# Patient Record
Sex: FEMALE | URBAN - METROPOLITAN AREA
[De-identification: names, ages, dates, MRNs, and addresses within clinical notes are randomized per-mention and may not be internally consistent; named-entity substitution may affect disease eponyms.]

---

## 2018-09-17 ENCOUNTER — APPOINTMENT (RX ONLY)
Dept: URBAN - METROPOLITAN AREA CLINIC 4 | Facility: CLINIC | Age: 33
Setting detail: DERMATOLOGY
End: 2018-09-17

## 2018-09-17 DIAGNOSIS — L81.4 OTHER MELANIN HYPERPIGMENTATION: ICD-10-CM

## 2018-09-17 DIAGNOSIS — L82.1 OTHER SEBORRHEIC KERATOSIS: ICD-10-CM

## 2018-09-17 DIAGNOSIS — D18.0 HEMANGIOMA: ICD-10-CM

## 2018-09-17 DIAGNOSIS — D22 MELANOCYTIC NEVI: ICD-10-CM

## 2018-09-17 PROBLEM — D22.71 MELANOCYTIC NEVI OF RIGHT LOWER LIMB, INCLUDING HIP: Status: ACTIVE | Noted: 2018-09-17

## 2018-09-17 PROBLEM — D18.01 HEMANGIOMA OF SKIN AND SUBCUTANEOUS TISSUE: Status: ACTIVE | Noted: 2018-09-17

## 2018-09-17 PROBLEM — D22.61 MELANOCYTIC NEVI OF RIGHT UPPER LIMB, INCLUDING SHOULDER: Status: ACTIVE | Noted: 2018-09-17

## 2018-09-17 PROBLEM — D22.72 MELANOCYTIC NEVI OF LEFT LOWER LIMB, INCLUDING HIP: Status: ACTIVE | Noted: 2018-09-17

## 2018-09-17 PROBLEM — D22.62 MELANOCYTIC NEVI OF LEFT UPPER LIMB, INCLUDING SHOULDER: Status: ACTIVE | Noted: 2018-09-17

## 2018-09-17 PROBLEM — D22.5 MELANOCYTIC NEVI OF TRUNK: Status: ACTIVE | Noted: 2018-09-17

## 2018-09-17 PROCEDURE — ? SUNSCREEN RECOMMENDATIONS

## 2018-09-17 PROCEDURE — 99203 OFFICE O/P NEW LOW 30 MIN: CPT

## 2018-09-17 PROCEDURE — ? COUNSELING

## 2018-09-17 ASSESSMENT — LOCATION DETAILED DESCRIPTION DERM
LOCATION DETAILED: RIGHT ANTERIOR PROXIMAL THIGH
LOCATION DETAILED: LEFT ULNAR DORSAL HAND
LOCATION DETAILED: LEFT PROXIMAL POSTERIOR UPPER ARM
LOCATION DETAILED: LEFT CENTRAL MALAR CHEEK
LOCATION DETAILED: RIGHT CENTRAL MALAR CHEEK
LOCATION DETAILED: RIGHT PROXIMAL DORSAL FOREARM
LOCATION DETAILED: RIGHT RADIAL DORSAL HAND
LOCATION DETAILED: LEFT INFERIOR ANTERIOR NECK
LOCATION DETAILED: RIGHT RIB CAGE
LOCATION DETAILED: SUPERIOR THORACIC SPINE
LOCATION DETAILED: LEFT ANTERIOR PROXIMAL THIGH
LOCATION DETAILED: LEFT PROXIMAL DORSAL FOREARM
LOCATION DETAILED: LEFT SUPERIOR MEDIAL UPPER BACK
LOCATION DETAILED: PERIUMBILICAL SKIN
LOCATION DETAILED: RIGHT DISTAL POSTERIOR UPPER ARM
LOCATION DETAILED: RIGHT SUPERIOR MEDIAL MIDBACK

## 2018-09-17 ASSESSMENT — LOCATION ZONE DERM
LOCATION ZONE: LEG
LOCATION ZONE: FACE
LOCATION ZONE: HAND
LOCATION ZONE: NECK
LOCATION ZONE: TRUNK
LOCATION ZONE: ARM

## 2018-09-17 ASSESSMENT — LOCATION SIMPLE DESCRIPTION DERM
LOCATION SIMPLE: RIGHT HAND
LOCATION SIMPLE: UPPER BACK
LOCATION SIMPLE: LEFT FOREARM
LOCATION SIMPLE: RIGHT THIGH
LOCATION SIMPLE: LEFT CHEEK
LOCATION SIMPLE: LEFT POSTERIOR UPPER ARM
LOCATION SIMPLE: LEFT THIGH
LOCATION SIMPLE: LEFT UPPER BACK
LOCATION SIMPLE: RIGHT CHEEK
LOCATION SIMPLE: RIGHT POSTERIOR UPPER ARM
LOCATION SIMPLE: RIGHT LOWER BACK
LOCATION SIMPLE: RIGHT FOREARM
LOCATION SIMPLE: LEFT HAND
LOCATION SIMPLE: LEFT ANTERIOR NECK
LOCATION SIMPLE: ABDOMEN

## 2018-09-17 NOTE — HPI: FULL BODY SKIN EXAMINATION
How Severe Are Your Spot(S)?: moderate
What Is The Reason For Today's Visit?: Full Body Skin Examination
What Is The Reason For Today's Visit? (Being Monitored For X): concerning skin lesions on an annual basis
Additional History: New patient. Lesion on left upper thigh for years. FBE.

## 2019-10-29 ENCOUNTER — HOSPITAL ENCOUNTER (INPATIENT)
Facility: MEDICAL CENTER | Age: 34
LOS: 1 days | End: 2019-10-30
Admitting: FAMILY MEDICINE
Payer: COMMERCIAL

## 2019-10-29 LAB
BASOPHILS # BLD AUTO: 0.5 % (ref 0–1.8)
BASOPHILS # BLD: 0.09 K/UL (ref 0–0.12)
EOSINOPHIL # BLD AUTO: 0.08 K/UL (ref 0–0.51)
EOSINOPHIL NFR BLD: 0.5 % (ref 0–6.9)
ERYTHROCYTE [DISTWIDTH] IN BLOOD BY AUTOMATED COUNT: 50.2 FL (ref 35.9–50)
HCT VFR BLD AUTO: 38.9 % (ref 37–47)
HGB BLD-MCNC: 12.5 G/DL (ref 12–16)
HOLDING TUBE BB 8507: NORMAL
IMM GRANULOCYTES # BLD AUTO: 0.24 K/UL (ref 0–0.11)
IMM GRANULOCYTES NFR BLD AUTO: 1.5 % (ref 0–0.9)
LYMPHOCYTES # BLD AUTO: 1.62 K/UL (ref 1–4.8)
LYMPHOCYTES NFR BLD: 9.9 % (ref 22–41)
MCH RBC QN AUTO: 31 PG (ref 27–33)
MCHC RBC AUTO-ENTMCNC: 32.1 G/DL (ref 33.6–35)
MCV RBC AUTO: 96.5 FL (ref 81.4–97.8)
MONOCYTES # BLD AUTO: 1.23 K/UL (ref 0–0.85)
MONOCYTES NFR BLD AUTO: 7.5 % (ref 0–13.4)
NEUTROPHILS # BLD AUTO: 13.14 K/UL (ref 2–7.15)
NEUTROPHILS NFR BLD: 80.1 % (ref 44–72)
NRBC # BLD AUTO: 0 K/UL
NRBC BLD-RTO: 0 /100 WBC
PLATELET # BLD AUTO: 212 K/UL (ref 164–446)
PMV BLD AUTO: 10.3 FL (ref 9–12.9)
RBC # BLD AUTO: 4.03 M/UL (ref 4.2–5.4)
WBC # BLD AUTO: 16.4 K/UL (ref 4.8–10.8)

## 2019-10-29 PROCEDURE — 36415 COLL VENOUS BLD VENIPUNCTURE: CPT

## 2019-10-29 PROCEDURE — 700102 HCHG RX REV CODE 250 W/ 637 OVERRIDE(OP): Performed by: NURSE PRACTITIONER

## 2019-10-29 PROCEDURE — A9270 NON-COVERED ITEM OR SERVICE: HCPCS | Performed by: NURSE PRACTITIONER

## 2019-10-29 PROCEDURE — 85025 COMPLETE CBC W/AUTO DIFF WBC: CPT

## 2019-10-29 PROCEDURE — 700111 HCHG RX REV CODE 636 W/ 250 OVERRIDE (IP): Performed by: NURSE PRACTITIONER

## 2019-10-29 PROCEDURE — 304965 HCHG RECOVERY SERVICES

## 2019-10-29 PROCEDURE — 59409 OBSTETRICAL CARE: CPT

## 2019-10-29 PROCEDURE — 770002 HCHG ROOM/CARE - OB PRIVATE (112)

## 2019-10-29 RX ORDER — IBUPROFEN 600 MG/1
600 TABLET ORAL EVERY 6 HOURS PRN
Status: DISCONTINUED | OUTPATIENT
Start: 2019-10-29 | End: 2019-10-30 | Stop reason: HOSPADM

## 2019-10-29 RX ORDER — METHYLERGONOVINE MALEATE 0.2 MG/ML
0.2 INJECTION INTRAVENOUS
Status: DISCONTINUED | OUTPATIENT
Start: 2019-10-29 | End: 2019-10-29 | Stop reason: HOSPADM

## 2019-10-29 RX ORDER — MISOPROSTOL 200 UG/1
800 TABLET ORAL
Status: DISCONTINUED | OUTPATIENT
Start: 2019-10-29 | End: 2019-10-29 | Stop reason: HOSPADM

## 2019-10-29 RX ORDER — HYDROCODONE BITARTRATE AND ACETAMINOPHEN 5; 325 MG/1; MG/1
1 TABLET ORAL EVERY 4 HOURS PRN
Status: DISCONTINUED | OUTPATIENT
Start: 2019-10-29 | End: 2019-10-30 | Stop reason: HOSPADM

## 2019-10-29 RX ORDER — SODIUM CHLORIDE, SODIUM LACTATE, POTASSIUM CHLORIDE, CALCIUM CHLORIDE 600; 310; 30; 20 MG/100ML; MG/100ML; MG/100ML; MG/100ML
INJECTION, SOLUTION INTRAVENOUS CONTINUOUS
Status: ACTIVE | OUTPATIENT
Start: 2019-10-29 | End: 2019-10-30

## 2019-10-29 RX ORDER — DOCUSATE SODIUM 100 MG/1
100 CAPSULE, LIQUID FILLED ORAL 2 TIMES DAILY PRN
Status: DISCONTINUED | OUTPATIENT
Start: 2019-10-29 | End: 2019-10-30 | Stop reason: HOSPADM

## 2019-10-29 RX ORDER — DEXTROSE, SODIUM CHLORIDE, SODIUM LACTATE, POTASSIUM CHLORIDE, AND CALCIUM CHLORIDE 5; .6; .31; .03; .02 G/100ML; G/100ML; G/100ML; G/100ML; G/100ML
INJECTION, SOLUTION INTRAVENOUS CONTINUOUS
Status: DISCONTINUED | OUTPATIENT
Start: 2019-10-30 | End: 2019-10-30 | Stop reason: HOSPADM

## 2019-10-29 RX ORDER — HYDROCODONE BITARTRATE AND ACETAMINOPHEN 5; 325 MG/1; MG/1
2 TABLET ORAL EVERY 4 HOURS PRN
Status: DISCONTINUED | OUTPATIENT
Start: 2019-10-29 | End: 2019-10-30 | Stop reason: HOSPADM

## 2019-10-29 RX ORDER — VITAMIN A ACETATE, BETA CAROTENE, ASCORBIC ACID, CHOLECALCIFEROL, .ALPHA.-TOCOPHEROL ACETATE, DL-, THIAMINE MONONITRATE, RIBOFLAVIN, NIACINAMIDE, PYRIDOXINE HYDROCHLORIDE, FOLIC ACID, CYANOCOBALAMIN, CALCIUM CARBONATE, FERROUS FUMARATE, ZINC OXIDE, CUPRIC OXIDE 3080; 12; 120; 400; 1; 1.84; 3; 20; 22; 920; 25; 200; 27; 10; 2 [IU]/1; UG/1; MG/1; [IU]/1; MG/1; MG/1; MG/1; MG/1; MG/1; [IU]/1; MG/1; MG/1; MG/1; MG/1; MG/1
1 TABLET, FILM COATED ORAL EVERY MORNING
Status: DISCONTINUED | OUTPATIENT
Start: 2019-10-30 | End: 2019-10-30 | Stop reason: HOSPADM

## 2019-10-29 RX ORDER — SODIUM CHLORIDE, SODIUM LACTATE, POTASSIUM CHLORIDE, CALCIUM CHLORIDE 600; 310; 30; 20 MG/100ML; MG/100ML; MG/100ML; MG/100ML
INJECTION, SOLUTION INTRAVENOUS PRN
Status: DISCONTINUED | OUTPATIENT
Start: 2019-10-29 | End: 2019-10-30 | Stop reason: HOSPADM

## 2019-10-29 RX ADMIN — IBUPROFEN 600 MG: 600 TABLET ORAL at 21:41

## 2019-10-29 RX ADMIN — OXYTOCIN 2000 ML/HR: 10 INJECTION, SOLUTION INTRAMUSCULAR; INTRAVENOUS at 17:43

## 2019-10-29 RX ADMIN — OXYTOCIN 125 ML/HR: 10 INJECTION, SOLUTION INTRAMUSCULAR; INTRAVENOUS at 18:31

## 2019-10-29 ASSESSMENT — LIFESTYLE VARIABLES
TOTAL SCORE: 0
HAVE YOU EVER FELT YOU SHOULD CUT DOWN ON YOUR DRINKING: NO
EVER_SMOKED: NEVER
EVER FELT BAD OR GUILTY ABOUT YOUR DRINKING: NO
TOTAL SCORE: 0
EVER HAD A DRINK FIRST THING IN THE MORNING TO STEADY YOUR NERVES TO GET RID OF A HANGOVER: NO
ALCOHOL_USE: NO
HAVE PEOPLE ANNOYED YOU BY CRITICIZING YOUR DRINKING: NO
TOTAL SCORE: 0
CONSUMPTION TOTAL: INCOMPLETE

## 2019-10-29 ASSESSMENT — COPD QUESTIONNAIRES
HAVE YOU SMOKED AT LEAST 100 CIGARETTES IN YOUR ENTIRE LIFE: NO/DON'T KNOW
DO YOU EVER COUGH UP ANY MUCUS OR PHLEGM?: NO/ONLY WITH OCCASIONAL COLDS OR INFECTIONS
DURING THE PAST 4 WEEKS HOW MUCH DID YOU FEEL SHORT OF BREATH: NONE/LITTLE OF THE TIME
COPD SCREENING SCORE: 0
IN THE PAST 12 MONTHS DO YOU DO LESS THAN YOU USED TO BECAUSE OF YOUR BREATHING PROBLEMS: DISAGREE/UNSURE

## 2019-10-29 ASSESSMENT — PATIENT HEALTH QUESTIONNAIRE - PHQ9
SUM OF ALL RESPONSES TO PHQ9 QUESTIONS 1 AND 2: 0
1. LITTLE INTEREST OR PLEASURE IN DOING THINGS: NOT AT ALL
1. LITTLE INTEREST OR PLEASURE IN DOING THINGS: NOT AT ALL
2. FEELING DOWN, DEPRESSED, IRRITABLE, OR HOPELESS: NOT AT ALL
SUM OF ALL RESPONSES TO PHQ9 QUESTIONS 1 AND 2: 0
2. FEELING DOWN, DEPRESSED, IRRITABLE, OR HOPELESS: NOT AT ALL

## 2019-10-30 VITALS
HEART RATE: 72 BPM | SYSTOLIC BLOOD PRESSURE: 120 MMHG | BODY MASS INDEX: 26.68 KG/M2 | RESPIRATION RATE: 16 BRPM | DIASTOLIC BLOOD PRESSURE: 71 MMHG | HEIGHT: 67 IN | TEMPERATURE: 97.8 F | OXYGEN SATURATION: 96 % | WEIGHT: 170 LBS

## 2019-10-30 LAB
ERYTHROCYTE [DISTWIDTH] IN BLOOD BY AUTOMATED COUNT: 49.1 FL (ref 35.9–50)
HCT VFR BLD AUTO: 34.8 % (ref 37–47)
HGB BLD-MCNC: 11 G/DL (ref 12–16)
MCH RBC QN AUTO: 30.1 PG (ref 27–33)
MCHC RBC AUTO-ENTMCNC: 31.6 G/DL (ref 33.6–35)
MCV RBC AUTO: 95.3 FL (ref 81.4–97.8)
PLATELET # BLD AUTO: 203 K/UL (ref 164–446)
PMV BLD AUTO: 10.4 FL (ref 9–12.9)
RBC # BLD AUTO: 3.65 M/UL (ref 4.2–5.4)
WBC # BLD AUTO: 15.8 K/UL (ref 4.8–10.8)

## 2019-10-30 PROCEDURE — A9270 NON-COVERED ITEM OR SERVICE: HCPCS | Performed by: NURSE PRACTITIONER

## 2019-10-30 PROCEDURE — 85027 COMPLETE CBC AUTOMATED: CPT

## 2019-10-30 PROCEDURE — 36415 COLL VENOUS BLD VENIPUNCTURE: CPT

## 2019-10-30 PROCEDURE — 700102 HCHG RX REV CODE 250 W/ 637 OVERRIDE(OP): Performed by: NURSE PRACTITIONER

## 2019-10-30 RX ADMIN — IBUPROFEN 600 MG: 600 TABLET ORAL at 03:56

## 2019-10-30 RX ADMIN — IBUPROFEN 600 MG: 600 TABLET ORAL at 13:18

## 2019-10-30 ASSESSMENT — EDINBURGH POSTNATAL DEPRESSION SCALE (EPDS)
I HAVE BEEN SO UNHAPPY THAT I HAVE HAD DIFFICULTY SLEEPING: NOT AT ALL
I HAVE BEEN ABLE TO LAUGH AND SEE THE FUNNY SIDE OF THINGS: AS MUCH AS I ALWAYS COULD
I HAVE BEEN ANXIOUS OR WORRIED FOR NO GOOD REASON: NO, NOT AT ALL
I HAVE FELT SAD OR MISERABLE: NO, NOT AT ALL
THINGS HAVE BEEN GETTING ON TOP OF ME: NO, I HAVE BEEN COPING AS WELL AS EVER
I HAVE BEEN SO UNHAPPY THAT I HAVE BEEN CRYING: NO, NEVER
I HAVE FELT SCARED OR PANICKY FOR NO GOOD REASON: NO, NOT AT ALL
THE THOUGHT OF HARMING MYSELF HAS OCCURRED TO ME: NEVER
I HAVE BLAMED MYSELF UNNECESSARILY WHEN THINGS WENT WRONG: NO, NEVER
I HAVE LOOKED FORWARD WITH ENJOYMENT TO THINGS: AS MUCH AS I EVER DID

## 2019-10-30 NOTE — PROGRESS NOTES
1915- Received bedside report from HAMILTON Lara RN. POC discussed. Pt in bed breast feeding. No needs at this time.  1942- Pt up to BR. Voided. Per care and new gown given.   1952- Pt transferred to  via wheelchair with viable baby boy in arms. FOB David at side.   2000- Bedside report given to Sadia HANNON RN. POC discussed. Assumed care. All questions answered.

## 2019-10-30 NOTE — PROGRESS NOTES
Assessment done. Pt. Has no c/o pain.Fundus firm.Lochia light. Fob at bedside,supportive.Pt. Caring for baby with good skill. Breastfeeding going well.Pt. Has received information on education .plan of care for today discussed.

## 2019-10-30 NOTE — LACTATION NOTE
In room for latch assistance.   Infant was found at MOB's right breast in the cross cradle position.  MOB stated feeling discomfort with latch.  Reminded MOB to place infant completely tummy to tummy with her and to wait for infant to open his mouth before placing her nipple into his mouth.  Infant latched onto breast immediately, but MOB reported still had discomfort with latch.  Removed infant from the breast and repeated the above steps, but this time used bottom of MOB's areola to keep infant's bottom lip from curling under.  With wide mouth again, infant latched deep onto MOB's breast and MOB denied feeling pain with latch.  See Lactation Assessment Flow Sheet under infant's chart for latch score and assessment.    Breastfeeding plan remains unchanged.    MOB verbalized understanding of all information provided to her and denied having any further questions at this time.  Encouraged MOB to call for lactation assistance as needed.

## 2019-10-30 NOTE — DISCHARGE INSTRUCTIONS
POSTPARTUM DISCHARGE INSTRUCTIONS FOR MOM    YOB: 1985   Age: 34 y.o.               Admit Date: 10/29/2019     Discharge Date: 10/30/2019  Attending Doctor:  Noman Hair                  Allergies:  Nkda [no known drug allergy]    Discharged to home by car. Discharged via wheelchair, hospital escort: Yes.  Special equipment needed: Not Applicable  Belongings with: Personal  Be sure to schedule a follow-up appointment with your primary care doctor or any specialists as instructed.     Discharge Plan:   Diet Plan: Discussed  Activity Level: Discussed  Confirmed Follow up Appointment: Patient to Call and Schedule Appointment  Confirmed Symptoms Management: Discussed  Influenza Vaccine Indication: Patient Refuses    REASONS TO CALL YOUR OBSTETRICIAN:  1.   Persistent fever or shaking chills (Temperature higher than 100.4)  2.   Heavy bleeding (soaking more than 1 pad per hour); Passing clots  3.   Foul odor from vagina  4.   Mastitis (Breast infection; breast pain, chills, fever, redness)  5.   Urinary pain, burning or frequency  6.   Episiotomy infection  7.   Abdominal incision infection  8.   Severe depression longer than 24 hours    HAND WASHING  · Prior to handling the baby.  · Before breastfeeding or bottle feeding baby.  · After using the bathroom or changing the baby's diaper.    WOUND CARE  Ask your physician for additional care instructions.  In general:    ·  Incision:      · Keep clean and dry.    · Do NOT lift anything heavier than your baby for up to 6 weeks.    · There should not be any opening or pus.      VAGINAL CARE  · Nothing inside vagina for 6 weeks: no sexual intercourse, tampons or douching.  · Bleeding may continue for 2-4 weeks.  Amount may vary.    · Call your physician for heavy bleeding which means soaking more than 1 pad per hour    BIRTH CONTROL  · It is possible to become pregnant at any time after delivery and while breastfeeding.  · Plan to discuss a method of birth  "control with your physician at your follow up visit. visit.    DIET AND ELIMINATION  · Eating more fiber (bran cereal, fruits, and vegetables) and drinking plenty of fluids will help to avoid constipation.  · Urinary frequency after childbirth is normal.    POSTPARTUM BLUES  During the first few days after birth, you may experience a sense of the \"blues\" which may include impatience, irritability or even crying.  These feeling come and go quickly.  However, as many as 1 in 10 women experience emotional symptoms known as postpartum depression.    Postpartum depression:  May start as early as the second or third day after delivery or take several weeks or months to develop.  Symptoms of \"blues\" are present, but are more intense:  Crying spells; loss of appetite; feelings of hopelessness or loss of control; fear of touching the baby; over concern or no concern at all about the baby; little or no concern about your own appearance/caring for yourself; and/or inability to sleep or excessive sleeping.  Contact your physician if you are experiencing any of these symptoms.    Crisis Hotline:  · [place] Crisis Hotline:  2-963-MXNGLVE  Or 1-924.981.9680  · [place] Crisis Hotline:  1-636.807.2644  Or 857-790-9476    PREVENTING SHAKEN BABY:  If you are angry or stressed, PUT THE BABY IN THE CRIB, step away, take some deep breaths, and wait until you are calm to care for the baby.  DO NOT SHAKE THE BABY.  You are not alone, call a supporter for help.    · Crisis Call Center 24/7 crisis line 529-436-4008 or 1-894.499.6647  · You can also text them, text \"ANSWER\" to 185730    QUIT SMOKING/TOBACCO USE:  I understand the use of any tobacco products increases my chance of suffering from future heart disease and could cause other illnesses which may shorten my life. Quitting the use of tobacco products is the single most important thing I can do to improve my health. For further information on smoking / tobacco cessation call a Toll " Free Quit Line at 1-685.521.1463 (*[employer] [place]) or 1-119.749.7967 (American Lung Association) or you can access the web based program at www.lungusa.org.    · [place] Tobacco Users Help Line:  (479) 834-8255       Toll Free: 1-243.783.6659  · Quit Tobacco Program [facility] (631)714-8710    DEPRESSION / SUICIDE RISK:  As you are discharged from this [facility], it is important to learn how to keep safe from harming yourself.    Recognize the warning signs:  · Abrupt changes in personality, positive or negative- including increase in energy   · Giving away possessions  · Change in eating patterns- significant weight changes-  positive or negative  · Change in sleeping patterns- unable to sleep or sleeping all the time   · Unwillingness or inability to communicate  · Depression  · Unusual sadness, discouragement and loneliness  · Talk of wanting to die  · Neglect of personal appearance   · Rebelliousness- reckless behavior  · Withdrawal from people/activities they love  · Confusion- inability to concentrate     If you or a loved one observes any of these behaviors or has concerns about self-harm, here's what you can do:  · Talk about it- your feelings and reasons for harming yourself  · Remove any means that you might use to hurt yourself (examples: pills, rope, extension cords, firearm)  · Get professional help from the community (Mental Health, Substance Abuse, psychological counseling)  · Do not be alone:Call your Safe Contact- someone whom you trust who will be there for you.  · Call your local CRISIS HOTLINE 209-0744 or 045-599-2701  · Call your local Children's Mobile Crisis Response Team [place] (921) 236-9699 or www.[employer]  · Call the toll free National Suicide Prevention Hotlines   · National Suicide Prevention Lifeline 340-121-WBAF (0550)  · National Hope Line Network 800-SUICIDE (561-4326)    DISCHARGE SURVEY:  Thank you for choosing [facility] [place] [facility].  We hope we provided you with very good care.  You may be receiving a survey in the mail.  Please fill it out.  Your opinion is valuable to us.    ADDITIONAL EDUCATIONAL MATERIALS GIVEN TO PATIENT:        My signature on this form indicates that:  1.  I have reviewed and understand the above information  2.  My questions regarding this information have been answered to my satisfaction.  3.  I have formulated a plan with my discharge nurse to obtain my prescribed medication for home.

## 2019-10-30 NOTE — PROGRESS NOTES
1702 pt arrived to L&D in active labor. Pt taken to S215.   1708 DEEPALI Chow at bedside, SVE as noted, 9/100/0, BBOW.  1710 RN at bedside, EFM and TOCO applied, admission procedures initiated at this time. RN and CNM continually at bedside.  1731 SROM, clear fluid, pt complete and pushing.  1732 viable baby Boy Loki born via DEEPALI Chow CNM. APGARs 8/9.  1743 placenta delivered spontaneously, intact. Mother and infant bonding skin-to-skin, both in stable condition.  1900 bedside report given to NOC RN, assumed care, POC discussed, all questions answered. VSS.

## 2019-10-30 NOTE — L&D DELIVERY NOTE
Delivery note     Ayala is a 34 year old  who was admitted on 39 at wks 1 days in active labor. She denied VB/ LOF and confirmed FM.  When she presented on Labor and Delivery her vaginal exam was 9 cm and she was admitted. Over the course of her stay she received a saline lock IV and was unmedicated.  She progressed to 10 cm after SROM at 1731.  She pushed involuntarily and had a NSVB of a viable male infant at 1732 on 10/29/19. Baby's Apgars were 8/9 , weight and length are pending. The baby was delivered to the maternal abdomen. The placenta was delivered approximately 11 minutes after delivery with no complications. A three vessel cord was noted. The perineum was inspected and found to be intact      Total EBL was 250 cc  Mother and baby are recovering in stable condition.   The [facility] was on his way and patient delivered before arrival     Ayesha Chow DNP, CNM

## 2019-10-30 NOTE — PROGRESS NOTES
Patient reviewed discharge instructions. Patient states she does not wish to have injoy reuben. Patient has no c/o pain.

## 2019-10-30 NOTE — CARE PLAN
Problem: Altered physiologic condition related to immediate post-delivery state and potential for bleeding/hemorrhage  Goal: Patient physiologically stable as evidenced by normal lochia, palpable uterine involution and vital signs within normal limits  Outcome: PROGRESSING AS EXPECTED  Note:   Fundus firm, lochia light,blood pressure and other vitals stable. Patient able to ambulate without dizziness. Patient intake of fluids wnl. Post delivery hematocrit and hemoglobin  is stable.      Problem: Potential knowledge deficit related to lack of understanding of self and  care  Goal: Patient will demonstrate ability to care for self and infant  Outcome: PROGRESSING AS EXPECTED  Note:   Patient demonstrates skills in baby care and self care. Patient able to feed,change diapers and comfort baby. Patient demonstrates good hygiene for self and baby.

## 2019-10-30 NOTE — CONSULTS
"Met with MOB for an initial lactation visit.  MOB delivered her fourth baby yesterday, 10/29/19, at 1732 at 39.1 weeks gestation.  Infant is approximately 19.5 hours old.  MOB reported she successfully breast fed her first two children for 1.5 years each and her third child for 2 years. MOB stated she \"always bruises and bleeds\" at the nipples with the start of breastfeeding.  Breast assessment performed and the right breast is bruised and blistered and the left nipple is benign.      Provided latch assistance at the left breast in the cross cradle position.  Re-educated MOB on how to wedge breast for deeper latch and demonstrated proper placement of her hands on infant's body and her breast for maximum support and comfort.  MOB was informed of the importance of keeping infant's head aligned with her breast for optimal milk transfer and to protect her nipples.  Infant latched onto the breast immediately and MOB reported feeling pain initially at the breast, but stated it went away after infant began suckling at the breast.    Encouraged MOB to apply Lanolin cream, which was already provided to her by Primary RN, as instructed to sore and damaged nipples to promote healing.    Informed MOB of the outpatient lactation assistance available to her through [facility] and the [facility] [place].    Breastfeeding Plan:  Offer infant the breast on demand per feeding cues and within 3-4 hours from the last feed for a total of 8 or more times in a 24 hour period.    MOB verbalized understanding of all information provided to her and denied having any further questions at this time.  Encouraged MOB to call for lactation assistance as needed.  "

## 2019-10-30 NOTE — PROGRESS NOTES
1953 -Patient transferred from labor and delivery. Two RN verification of infant and parent armbands. Report received from AISHWARYA Yanes. Patient oriented to unit, call light, emergency light, and infant security. Assessment completed, fundus firm one finger width below u, lochia light. Patient has voided and is doing so without problems. Patient declines intervention for pain at this time. Pitocin infusing at 125 ml/hr. Patient encouraged to call with needs. Rounding in place. Bed is locked and in lowest position, call light within reach.    0213- Pitocin infusion complete.  Pt requesting to have IV taken out.  Educated pt on possible need of IV later if blood loss is great.  Pt verbalized understanding, would still like IV to be removed, state we can obtain IV access later if need be. IV removed.

## 2019-10-30 NOTE — PROGRESS NOTES
FAMILY MEDICINE  POSTPARTUM PROGRESS NOTE    Name:               Olesya Chua   Date/Time:              10/30/2019 2:28 PM  Gestational Age at delivery:  41.0  Admit Date:               10/29/2019  Admitting Dx:               Pregnancy  Labor and delivery, indication for care  Labor and delivery, indication for care  Labor and delivery, indication for care    PP day 1     Subjective: No complaints   Abdominal pain no   Ambulating   no  Tolerating PO  yes  Flatus    yes  Bleeding   Yes light   Voiding   yes   Dizziness   no  Breast feeding  yes  Breast tenderness  no    Vitals:    10/30/19 0600   BP: 120/71   Pulse: 72   Resp: 16   Temp: 36.6 °C (97.8 °F)   SpO2: 96%       Exam:   Gen: A0x4 no distress   HEENT: NCAT  CVS: NS1S2 NMRG  Pulm: CTABL   Abdomen: Fundus below umbilicus, no fundal tenderness   Extremities: No edema    Meds:   No current facility-administered medications on file prior to encounter.      Current Outpatient Medications on File Prior to Encounter   Medication Sig Dispense Refill   • ibuprofen (MOTRIN) 600 MG TABS Take 1 Tab by mouth every 6 hours as needed (Cramping). 30 Each prn   • prenatal vit/fe fumarate/fa (PRENATAL S) 27-0.8 MG TABS Take 1 Tab by mouth every morning.           Lab Results:   Recent Labs     10/29/19  1824 10/30/19  0452   WBC 16.4* 15.8*   RBC 4.03* 3.65*   HEMOGLOBIN 12.5 11.0*   HEMATOCRIT 38.9 34.8*   MCV 96.5 95.3   MCH 31.0 30.1   RDW 50.2* 49.1   PLATELETCT 212 203   MPV 10.3 10.4   NEUTSPOLYS 80.10*  --    LYMPHOCYTES 9.90*  --    MONOCYTES 7.50  --    EOSINOPHILS 0.50  --    BASOPHILS 0.50  --        Assessment and Plan: 34 y.o. yo G6 now P4 s/p .   Uncomplicated postpartum course, lochia light, ambulating and tolerating PO. No signs of infection, or excessive bleeding.   Continue routine postpartum care, encourage ambulation, pain control, anticipate D/C home PPD#1    Hunter Huang M.D.   PGY-3  [facility]   [phone]

## 2019-10-30 NOTE — H&P
"History and Physical      Olesya Chua is a 34 y.o. female  at 39w1d who presents in active labor at 9 cm.    Subjective:   positive  For CTXS.   positive Feels pain   negative for LOF  negative for vaginal bleeding.   positive for fetal movement    ROS: Review of systems not obtained due to patient factors.    History reviewed. No pertinent past medical history.  History reviewed. No pertinent surgical history.  History reviewed. No pertinent family history.  OB history:  NSVB X 3, SAB X2, no complications  Social:   to David, no alcohol, tobacco, or drug use  Stay at home mom, RN    Allergies: Nkda [no known drug allergy]      Prenatal care with [facility] starting at 7 wk GA with no problems:  Patient Active Problem List    Diagnosis Date Noted   • Labor and delivery indication for care or intervention 10/14/2014   • Labor, prolonged latent phase 2012               Objective:      /58   Pulse 67   Ht 1.702 m (5' 7\")   Wt 77.1 kg (170 lb)     General:   uncomfortable and urge to push   Skin:   normal   HEENT:     Lungs:   CTA bilateral   Heart:   S1, S2 normal, no murmur, click, rub or gallop, regular rate and rhythm, brisk carotid upstroke without bruits, peripheral pulses very brisk, chest is clear without rales or wheezing, no pedal edema, no JVD, no hepatosplenomegaly   Abdomen:   gravid, NT   EFW:  #8   Pelvis:  adequate with gynecoid pelvis   FHT:  140BPM   Uterine Size: S=D   Presentations: Cephalic   Cervix:     Dilation: 9cm    Effacement: 90%    Station:  0    Consistency: Soft    Position: Middle     Lab Review  Lab:   Blood type: O pos, rubella immune, GBS negative, RPR, Hep B and HIV all negative     No results found for this or any previous visit (from the past 5880 hour(s)).     Assessment:   Olesya Chua at 39w1d  Labor status: Active phase labor.  Obstetrical history significant for   Patient Active Problem List    Diagnosis Date Noted   • Labor and " delivery indication for care or intervention 10/14/2014   • Labor, prolonged latent phase 05/09/2012   .      Plan:     Admit to L&D  GBS negative  Admitted, IV saline lock, Continuous EFM, labor support  Anticipate NSVB, Resident notified and on way

## 2019-10-31 NOTE — PROGRESS NOTES
Discharge teaching reviewed with mom .1st  screen noted to be complete and 2nd [place] screen and other  f/u appts reviewed with mom .Pre and post ductal oxygen assessment done.Mark luna this am wnl . Car seat present .Birth certificate done.Hearing screen done. Bands matched and security tag removed. Baby d/c'd home with mom.

## 2022-08-08 ENCOUNTER — HOSPITAL ENCOUNTER (EMERGENCY)
Dept: RADIOLOGY | Facility: MEDICAL CENTER | Age: 37
End: 2022-08-08
Attending: MIDWIFE
Payer: COMMERCIAL

## 2022-08-08 DIAGNOSIS — O26.843 UTERINE SIZE-DATE DISCREPANCY IN THIRD TRIMESTER: ICD-10-CM

## 2022-08-08 PROCEDURE — 76805 OB US >/= 14 WKS SNGL FETUS: CPT

## 2022-09-21 ENCOUNTER — HOSPITAL ENCOUNTER (OUTPATIENT)
Facility: MEDICAL CENTER | Age: 37
End: 2022-09-21
Attending: MIDWIFE
Payer: COMMERCIAL

## 2022-09-21 PROCEDURE — 87150 DNA/RNA AMPLIFIED PROBE: CPT

## 2022-09-21 PROCEDURE — 87081 CULTURE SCREEN ONLY: CPT

## 2022-09-26 LAB — GP B STREP DNA SPEC QL NAA+PROBE: NEGATIVE

## 2023-05-10 SDOH — ECONOMIC STABILITY: FOOD INSECURITY: WITHIN THE PAST 12 MONTHS, THE FOOD YOU BOUGHT JUST DIDN'T LAST AND YOU DIDN'T HAVE MONEY TO GET MORE.: NEVER TRUE

## 2023-05-10 SDOH — ECONOMIC STABILITY: FOOD INSECURITY: WITHIN THE PAST 12 MONTHS, YOU WORRIED THAT YOUR FOOD WOULD RUN OUT BEFORE YOU GOT MONEY TO BUY MORE.: NEVER TRUE

## 2023-05-10 SDOH — ECONOMIC STABILITY: TRANSPORTATION INSECURITY
IN THE PAST 12 MONTHS, HAS LACK OF TRANSPORTATION KEPT YOU FROM MEETINGS, WORK, OR FROM GETTING THINGS NEEDED FOR DAILY LIVING?: NO

## 2023-05-10 SDOH — ECONOMIC STABILITY: TRANSPORTATION INSECURITY
IN THE PAST 12 MONTHS, HAS THE LACK OF TRANSPORTATION KEPT YOU FROM MEDICAL APPOINTMENTS OR FROM GETTING MEDICATIONS?: NO

## 2023-05-10 SDOH — HEALTH STABILITY: PHYSICAL HEALTH: ON AVERAGE, HOW MANY MINUTES DO YOU ENGAGE IN EXERCISE AT THIS LEVEL?: 30 MIN

## 2023-05-10 SDOH — ECONOMIC STABILITY: HOUSING INSECURITY
IN THE LAST 12 MONTHS, WAS THERE A TIME WHEN YOU DID NOT HAVE A STEADY PLACE TO SLEEP OR SLEPT IN A SHELTER (INCLUDING NOW)?: NO

## 2023-05-10 SDOH — ECONOMIC STABILITY: INCOME INSECURITY: IN THE LAST 12 MONTHS, WAS THERE A TIME WHEN YOU WERE NOT ABLE TO PAY THE MORTGAGE OR RENT ON TIME?: NO

## 2023-05-10 SDOH — ECONOMIC STABILITY: HOUSING INSECURITY: IN THE LAST 12 MONTHS, HOW MANY PLACES HAVE YOU LIVED?: 1

## 2023-05-10 SDOH — HEALTH STABILITY: PHYSICAL HEALTH: ON AVERAGE, HOW MANY DAYS PER WEEK DO YOU ENGAGE IN MODERATE TO STRENUOUS EXERCISE (LIKE A BRISK WALK)?: 6 DAYS

## 2023-05-10 SDOH — ECONOMIC STABILITY: INCOME INSECURITY: HOW HARD IS IT FOR YOU TO PAY FOR THE VERY BASICS LIKE FOOD, HOUSING, MEDICAL CARE, AND HEATING?: NOT HARD AT ALL

## 2023-05-10 ASSESSMENT — SOCIAL DETERMINANTS OF HEALTH (SDOH)
DO YOU BELONG TO ANY CLUBS OR ORGANIZATIONS SUCH AS CHURCH GROUPS UNIONS, FRATERNAL OR ATHLETIC GROUPS, OR SCHOOL GROUPS?: YES
HOW OFTEN DO YOU GET TOGETHER WITH FRIENDS OR RELATIVES?: MORE THAN THREE TIMES A WEEK
HOW OFTEN DO YOU ATTEND CHURCH OR RELIGIOUS SERVICES?: MORE THAN 4 TIMES PER YEAR
HOW OFTEN DO YOU ATTENT MEETINGS OF THE CLUB OR ORGANIZATION YOU BELONG TO?: MORE THAN 4 TIMES PER YEAR
IN A TYPICAL WEEK, HOW MANY TIMES DO YOU TALK ON THE PHONE WITH FAMILY, FRIENDS, OR NEIGHBORS?: MORE THAN THREE TIMES A WEEK

## 2023-05-10 ASSESSMENT — LIFESTYLE VARIABLES
HOW MANY STANDARD DRINKS CONTAINING ALCOHOL DO YOU HAVE ON A TYPICAL DAY: PATIENT DECLINED
SKIP TO QUESTIONS 9-10: 0
HOW OFTEN DO YOU HAVE SIX OR MORE DRINKS ON ONE OCCASION: NEVER
HOW OFTEN DO YOU HAVE A DRINK CONTAINING ALCOHOL: MONTHLY OR LESS
AUDIT-C TOTAL SCORE: -1

## 2023-05-11 SDOH — ECONOMIC STABILITY: HOUSING INSECURITY: IN THE LAST 12 MONTHS, HOW MANY PLACES HAVE YOU LIVED?: 1

## 2023-05-11 SDOH — HEALTH STABILITY: PHYSICAL HEALTH: ON AVERAGE, HOW MANY MINUTES DO YOU ENGAGE IN EXERCISE AT THIS LEVEL?: 30 MIN

## 2023-05-11 SDOH — HEALTH STABILITY: MENTAL HEALTH
STRESS IS WHEN SOMEONE FEELS TENSE, NERVOUS, ANXIOUS, OR CAN'T SLEEP AT NIGHT BECAUSE THEIR MIND IS TROUBLED. HOW STRESSED ARE YOU?: NOT AT ALL

## 2023-05-11 SDOH — HEALTH STABILITY: PHYSICAL HEALTH: ON AVERAGE, HOW MANY DAYS PER WEEK DO YOU ENGAGE IN MODERATE TO STRENUOUS EXERCISE (LIKE A BRISK WALK)?: 6 DAYS

## 2023-05-11 SDOH — ECONOMIC STABILITY: TRANSPORTATION INSECURITY
IN THE PAST 12 MONTHS, HAS LACK OF RELIABLE TRANSPORTATION KEPT YOU FROM MEDICAL APPOINTMENTS, MEETINGS, WORK OR FROM GETTING THINGS NEEDED FOR DAILY LIVING?: NO

## 2023-05-11 ASSESSMENT — SOCIAL DETERMINANTS OF HEALTH (SDOH)
HOW OFTEN DO YOU HAVE A DRINK CONTAINING ALCOHOL: MONTHLY OR LESS
IN A TYPICAL WEEK, HOW MANY TIMES DO YOU TALK ON THE PHONE WITH FAMILY, FRIENDS, OR NEIGHBORS?: MORE THAN THREE TIMES A WEEK
HOW OFTEN DO YOU HAVE SIX OR MORE DRINKS ON ONE OCCASION: NEVER
DO YOU BELONG TO ANY CLUBS OR ORGANIZATIONS SUCH AS CHURCH GROUPS UNIONS, FRATERNAL OR ATHLETIC GROUPS, OR SCHOOL GROUPS?: YES
HOW HARD IS IT FOR YOU TO PAY FOR THE VERY BASICS LIKE FOOD, HOUSING, MEDICAL CARE, AND HEATING?: NOT HARD AT ALL
HOW OFTEN DO YOU ATTEND CHURCH OR RELIGIOUS SERVICES?: MORE THAN 4 TIMES PER YEAR
WITHIN THE PAST 12 MONTHS, YOU WORRIED THAT YOUR FOOD WOULD RUN OUT BEFORE YOU GOT THE MONEY TO BUY MORE: NEVER TRUE
HOW OFTEN DO YOU ATTENT MEETINGS OF THE CLUB OR ORGANIZATION YOU BELONG TO?: MORE THAN 4 TIMES PER YEAR
HOW OFTEN DO YOU GET TOGETHER WITH FRIENDS OR RELATIVES?: MORE THAN THREE TIMES A WEEK
HOW MANY DRINKS CONTAINING ALCOHOL DO YOU HAVE ON A TYPICAL DAY WHEN YOU ARE DRINKING: PATIENT DECLINED

## 2023-05-17 ENCOUNTER — OFFICE VISIT (OUTPATIENT)
Dept: MEDICAL GROUP | Facility: MEDICAL CENTER | Age: 38
End: 2023-05-17
Payer: COMMERCIAL

## 2023-05-17 VITALS
DIASTOLIC BLOOD PRESSURE: 58 MMHG | WEIGHT: 143 LBS | OXYGEN SATURATION: 95 % | HEART RATE: 61 BPM | SYSTOLIC BLOOD PRESSURE: 92 MMHG | HEIGHT: 68 IN | TEMPERATURE: 98.9 F | BODY MASS INDEX: 21.67 KG/M2

## 2023-05-17 DIAGNOSIS — Z11.59 NEED FOR HEPATITIS C SCREENING TEST: ICD-10-CM

## 2023-05-17 DIAGNOSIS — Z13.228 SCREENING FOR ENDOCRINE, METABOLIC AND IMMUNITY DISORDER: ICD-10-CM

## 2023-05-17 DIAGNOSIS — Z13.0 SCREENING FOR ENDOCRINE, METABOLIC AND IMMUNITY DISORDER: ICD-10-CM

## 2023-05-17 DIAGNOSIS — Z13.29 SCREENING FOR ENDOCRINE, METABOLIC AND IMMUNITY DISORDER: ICD-10-CM

## 2023-05-17 DIAGNOSIS — R07.9 CHEST PAIN, UNSPECIFIED TYPE: ICD-10-CM

## 2023-05-17 DIAGNOSIS — I49.9 IRREGULAR HEART BEAT: ICD-10-CM

## 2023-05-17 DIAGNOSIS — Z13.6 SCREENING FOR CARDIOVASCULAR CONDITION: ICD-10-CM

## 2023-05-17 PROCEDURE — 93000 ELECTROCARDIOGRAM COMPLETE: CPT

## 2023-05-17 PROCEDURE — 99204 OFFICE O/P NEW MOD 45 MIN: CPT

## 2023-05-17 PROCEDURE — 3078F DIAST BP <80 MM HG: CPT

## 2023-05-17 PROCEDURE — 3074F SYST BP LT 130 MM HG: CPT

## 2023-05-17 ASSESSMENT — ENCOUNTER SYMPTOMS
COUGH: 0
CHILLS: 0
ORTHOPNEA: 0
FEVER: 0
SHORTNESS OF BREATH: 0
PALPITATIONS: 0

## 2023-05-17 ASSESSMENT — PATIENT HEALTH QUESTIONNAIRE - PHQ9: CLINICAL INTERPRETATION OF PHQ2 SCORE: 0

## 2023-05-17 NOTE — PROGRESS NOTES
"Subjective:     CC: Establish Care (Prior pcp : unknown ) and Other (Had a irregular heart beat in the past want to see how shes doing )      HISTORY OF THE PRESENT ILLNESS: Olesya is a 37 y.o. female here to establish care and discuss:      Irregular Heart Beat: History of bigeminy, PACs, PVCs, couplets and triplets per Holter monitor 13 years ago. She also completed an ECHO at that time which showed an ejection fraction of 60 to 65%, normal right ventricular systolic function, normal right and left atrium size, thickened mitral valve leaflets and trace mitral regurg, structurally normal aortic valve without stenosis or regurgitation, structurally normal tricuspid valve with trace tricuspid regurgitation.  Right ventricular systolic pressure was 23 mmHg, structurally normal pulmonic valve without significant stenosis or regurgitation, normal pericardium, normal aortic root. She experiences chest pain periodically, this occurs when she feels her heart \"fluttering\".  She states it does not happen all the time, just occasionally. She denies chest pain or pressure today, no ankle swelling.     Previous PCP: unknown  Allergies:   Allergies   Allergen Reactions    Nkda [No Known Drug Allergy]      Medications: No current outpatient medications on file.    ROS:   Review of Systems   Constitutional:  Negative for chills and fever.   Respiratory:  Negative for cough and shortness of breath.    Cardiovascular:  Negative for chest pain, palpitations, orthopnea and leg swelling.          Objective:     Exam:   BP 92/58 (BP Location: Left arm, Patient Position: Sitting, BP Cuff Size: Adult)   Pulse 61   Temp 37.2 °C (98.9 °F) (Temporal)   Ht 1.727 m (5' 8\")   Wt 64.9 kg (143 lb)   SpO2 95%  Body mass index is 21.74 kg/m².    Physical Exam  Constitutional:       Appearance: Normal appearance.   Eyes:      Pupils: Pupils are equal, round, and reactive to light.   Cardiovascular:      Rate and Rhythm: Normal rate. Rhythm " irregular.      Pulses: Normal pulses.      Heart sounds: Normal heart sounds.   Pulmonary:      Effort: Pulmonary effort is normal.      Breath sounds: Normal breath sounds.   Abdominal:      General: Bowel sounds are normal.      Palpations: Abdomen is soft.   Musculoskeletal:      Right lower leg: No edema.      Left lower leg: No edema.   Neurological:      Mental Status: She is alert and oriented to person, place, and time.   Psychiatric:         Mood and Affect: Mood normal.         Behavior: Behavior normal.           Assessment & Plan:   37 y.o. female with the following -    1. Irregular heart beat  2. Chest pain, unspecified type  Chronic, stable  EKG today showed NSR with frequent PVCs.   - TSH WITH REFLEX TO FT4; Future  - EKG - Clinic Performed    2. Chest pain, unspecified type  Undiagnosed problem with uncertain prognosis  Chest pain most likely associated with irregular rhythm. She does have a history of bigeminy, PVCs, PACs and couplets from Holter Monitor 13 years ago. She denies chest pain today. No signs of shortness of breath, ankle swelling. Lungs clear bilaterally. Will consider repeating ECHO/ stress test if symptoms worsen.   - EKG - Clinic Performed    3. Screening for endocrine, metabolic and immunity disorder  - Comp Metabolic Panel; Future  - CBC WITHOUT DIFFERENTIAL; Future    4. Need for hepatitis C screening test  - HEP C VIRUS ANTIBODY; Future    5. Screening for cardiovascular condition  - Lipid Profile; Future        Anticipatory guidance  Patient counseled about skin care, diet, supplements, prenatal vitamins, safe sex and exercise, smoking, drugs/alcohol use, and wearing a seat belt.       Return in about 6 months (around 11/17/2023).    Please note that this dictation was created using voice recognition software. I have made every reasonable attempt to correct obvious errors, but I expect that there are errors of grammar and possibly content that I did not discover before  finalizing the note.

## 2025-01-31 ENCOUNTER — HOSPITAL ENCOUNTER (OUTPATIENT)
Dept: RADIOLOGY | Facility: MEDICAL CENTER | Age: 40
End: 2025-01-31
Attending: MIDWIFE
Payer: COMMERCIAL

## 2025-01-31 DIAGNOSIS — Z34.81 PRENATAL CARE, SUBSEQUENT PREGNANCY, FIRST TRIMESTER: ICD-10-CM

## 2025-01-31 PROCEDURE — 76801 OB US < 14 WKS SINGLE FETUS: CPT

## 2025-04-18 ENCOUNTER — HOSPITAL ENCOUNTER (OUTPATIENT)
Dept: RADIOLOGY | Facility: MEDICAL CENTER | Age: 40
End: 2025-04-18
Attending: MIDWIFE
Payer: COMMERCIAL

## 2025-04-18 DIAGNOSIS — Z34.82 PRENATAL CARE, SUBSEQUENT PREGNANCY, SECOND TRIMESTER: ICD-10-CM

## 2025-04-18 PROCEDURE — 76805 OB US >/= 14 WKS SNGL FETUS: CPT
